# Patient Record
Sex: MALE | Race: BLACK OR AFRICAN AMERICAN | NOT HISPANIC OR LATINO | Employment: OTHER | URBAN - METROPOLITAN AREA
[De-identification: names, ages, dates, MRNs, and addresses within clinical notes are randomized per-mention and may not be internally consistent; named-entity substitution may affect disease eponyms.]

---

## 2019-08-25 ENCOUNTER — HOSPITAL ENCOUNTER (EMERGENCY)
Facility: HOSPITAL | Age: 64
Discharge: HOME | End: 2019-08-25
Attending: EMERGENCY MEDICINE
Payer: MEDICAID

## 2019-08-25 VITALS
BODY MASS INDEX: 23.84 KG/M2 | OXYGEN SATURATION: 99 % | DIASTOLIC BLOOD PRESSURE: 86 MMHG | HEIGHT: 69 IN | SYSTOLIC BLOOD PRESSURE: 150 MMHG | TEMPERATURE: 98 F | HEART RATE: 88 BPM | RESPIRATION RATE: 20 BRPM | WEIGHT: 160.94 LBS

## 2019-08-25 DIAGNOSIS — N41.9 PROSTATITIS, UNSPECIFIED PROSTATITIS TYPE: Primary | ICD-10-CM

## 2019-08-25 LAB
BACTERIA #/AREA URNS AUTO: ABNORMAL /HPF
BILIRUB UR QL STRIP: NEGATIVE
CLARITY UR REFRACT.AUTO: CLEAR
COLOR UR AUTO: YELLOW
GLUCOSE UR QL STRIP: NEGATIVE
HGB UR QL STRIP: NEGATIVE
HYALINE CASTS UR QL AUTO: 0 /LPF
KETONES UR QL STRIP: ABNORMAL
LEUKOCYTE ESTERASE UR QL STRIP: NEGATIVE
MICROSCOPIC COMMENT: ABNORMAL
NITRITE UR QL STRIP: NEGATIVE
OTHER ELEMENTS URNS MICRO: ABNORMAL
PH UR STRIP: 6 [PH] (ref 5–8)
PROT UR QL STRIP: ABNORMAL
RBC #/AREA URNS AUTO: 15 /HPF (ref 0–4)
SP GR UR STRIP: 1.02 (ref 1–1.03)
SQUAMOUS #/AREA URNS AUTO: 0 /HPF
URN SPEC COLLECT METH UR: ABNORMAL
UROBILINOGEN UR STRIP-ACNC: <2 EU/DL
WBC #/AREA URNS AUTO: 7 /HPF (ref 0–5)
YEAST UR QL AUTO: ABNORMAL

## 2019-08-25 PROCEDURE — 99283 EMERGENCY DEPT VISIT LOW MDM: CPT | Mod: ER

## 2019-08-25 PROCEDURE — 25000003 PHARM REV CODE 250: Mod: ER | Performed by: EMERGENCY MEDICINE

## 2019-08-25 PROCEDURE — 81000 URINALYSIS NONAUTO W/SCOPE: CPT | Mod: ER

## 2019-08-25 RX ORDER — ACETAMINOPHEN 500 MG
1000 TABLET ORAL
Status: COMPLETED | OUTPATIENT
Start: 2019-08-25 | End: 2019-08-25

## 2019-08-25 RX ORDER — PHENAZOPYRIDINE HYDROCHLORIDE 100 MG/1
100 TABLET, FILM COATED ORAL 3 TIMES DAILY PRN
COMMUNITY

## 2019-08-25 RX ORDER — CIPROFLOXACIN 500 MG/1
500 TABLET ORAL
Status: COMPLETED | OUTPATIENT
Start: 2019-08-25 | End: 2019-08-25

## 2019-08-25 RX ORDER — NITROFURANTOIN 25; 75 MG/1; MG/1
100 CAPSULE ORAL
COMMUNITY

## 2019-08-25 RX ORDER — IBUPROFEN 800 MG/1
800 TABLET ORAL 3 TIMES DAILY
COMMUNITY

## 2019-08-25 RX ORDER — CIPROFLOXACIN 500 MG/1
500 TABLET ORAL 2 TIMES DAILY
Qty: 20 TABLET | Refills: 0 | Status: SHIPPED | OUTPATIENT
Start: 2019-08-25 | End: 2019-09-04

## 2019-08-25 RX ORDER — TRAMADOL HYDROCHLORIDE 100 MG/1
100 TABLET, EXTENDED RELEASE ORAL DAILY
COMMUNITY

## 2019-08-25 RX ADMIN — ACETAMINOPHEN 1000 MG: 500 TABLET ORAL at 08:08

## 2019-08-25 RX ADMIN — CIPROFLOXACIN HYDROCHLORIDE 500 MG: 500 TABLET, FILM COATED ORAL at 08:08

## 2019-08-26 NOTE — ED PROVIDER NOTES
"Encounter Date: 8/25/2019       History     Chief Complaint   Patient presents with    Generalized Body Aches     c/o pain to back rectum,"privates"     Patient currently presents with complaints of pressure in the perineum.  Onset noted gradually over the past several weeks.  Patient unable to pinpoint a specific time frame.  He does note some associated dysuria intermittently.  He also describes pain in the rectal area.  Reports history of prior urinary infections. History somewhat limited by patient's chronic dementia.        Review of patient's allergies indicates:  No Known Allergies  Past Medical History:   Diagnosis Date    Bipolar 1 disorder     Hyperlipidemia     Schizophrenia      History reviewed. No pertinent surgical history.  History reviewed. No pertinent family history.  Social History     Tobacco Use    Smoking status: Current Every Day Smoker     Packs/day: 1.00    Smokeless tobacco: Never Used   Substance Use Topics    Alcohol use: Not Currently    Drug use: Not Currently     Review of Systems   Constitutional: Negative for chills and fever.   HENT: Negative for congestion.    Respiratory: Negative for chest tightness and shortness of breath.    Cardiovascular: Negative for chest pain and leg swelling.   Gastrointestinal: Negative for abdominal pain, constipation, diarrhea, nausea and vomiting.   Genitourinary: Positive for dysuria. Negative for decreased urine volume, difficulty urinating, discharge, flank pain, frequency, genital sores, hematuria, penile swelling, scrotal swelling, testicular pain and urgency.   Skin: Negative for color change and rash.   Allergic/Immunologic: Negative for immunocompromised state.   Neurological: Negative for weakness and numbness.   Hematological: Negative for adenopathy. Does not bruise/bleed easily.   All other systems reviewed and are negative.    Physical Exam     Initial Vitals [08/25/19 2004]   BP Pulse Resp Temp SpO2   (!) 150/86 88 20 98.3 °F " "(36.8 °C) 99 %      MAP       --         Vitals:    08/25/19 2004   BP: (!) 150/86   Pulse: 88   Resp: 20   Temp: 98.3 °F (36.8 °C)   TempSrc: Oral   SpO2: 99%   Weight: 73 kg (160 lb 15 oz)   Height: 5' 9" (1.753 m)       Physical Exam    Nursing note and vitals reviewed.  Constitutional: He appears well-developed and well-nourished. He is not diaphoretic. No distress.   HENT:   Head: Normocephalic and atraumatic.   Nose: Nose normal.   Mouth/Throat: Oropharynx is clear and moist.   Eyes: Conjunctivae and EOM are normal. Pupils are equal, round, and reactive to light. No scleral icterus.   Neck: Neck supple. No JVD present.   Cardiovascular: Normal rate, regular rhythm, normal heart sounds and intact distal pulses. Exam reveals no gallop and no friction rub.    No murmur heard.  Pulmonary/Chest: Breath sounds normal. No respiratory distress. He has no wheezes. He has no rhonchi. He has no rales.   Abdominal: Soft. Bowel sounds are normal. He exhibits no distension. There is no tenderness. Hernia confirmed negative in the right inguinal area and confirmed negative in the left inguinal area.   Genitourinary: Rectum normal, testes normal and penis normal. Prostate is enlarged and tender (mild).   Musculoskeletal: Normal range of motion. He exhibits no edema.   Lymphadenopathy: No inguinal adenopathy noted on the right or left side.   Neurological: He is alert and oriented to person, place, and time. He has normal strength. No cranial nerve deficit. GCS score is 15. GCS eye subscore is 4. GCS verbal subscore is 5. GCS motor subscore is 6.   Skin: Skin is warm and dry. No rash noted.   Superficial linear abrasions noted at the patient's belt line on the lower abdominal wall   Psychiatric: He has a normal mood and affect. His behavior is normal.       ED Course   Procedures  Labs Reviewed   URINALYSIS, REFLEX TO URINE CULTURE - Abnormal; Notable for the following components:       Result Value    Protein, UA 1+ (*)     " Ketones, UA Trace (*)     All other components within normal limits    Narrative:     Preferred Collection Type->Urine, Clean Catch   URINALYSIS MICROSCOPIC - Abnormal; Notable for the following components:    RBC, UA 15 (*)     WBC, UA 7 (*)     Bacteria Moderate (*)     Other (U/A) Moderate (*)     All other components within normal limits    Narrative:     Preferred Collection Type->Urine, Clean Catch          Imaging Results    None           Medical Decision Making:   ED Management:  All findings were reviewed with the patient/family in detail along with the diagnosis of uncomplicated prostatitis.  NH staff advised of need for PCP FU to insure resolution of this process and consideration for PSA levels and uro eval.  I see no indication of an emergent process beyond that addressed during our encounter but have duly counseled the patient/family regarding the need for prompt follow-up as well as the indications that should prompt immediate return to the emergency room should new or worrisome developments occur.  The patient/family communicates understanding of all this information and all remaining questions and concerns were addressed at this time.                          Clinical Impression:       ICD-10-CM ICD-9-CM   1. Prostatitis, unspecified prostatitis type N41.9 601.9                                Deejay Mills MD  08/25/19 2053       Deejay Mills MD  08/25/19 2114

## 2019-08-26 NOTE — ED NOTES
Report given to JO Perez at NH. Advised Ana of Dx and for pt to have follow-up with PCP and to fill script of cipro. Awaiting ride at this time.

## 2019-08-31 ENCOUNTER — HOSPITAL ENCOUNTER (EMERGENCY)
Facility: HOSPITAL | Age: 64
Discharge: PSYCHIATRIC OR SUBSTANCE USE FACILITY | End: 2019-08-31
Attending: EMERGENCY MEDICINE
Payer: MEDICAID

## 2019-08-31 VITALS
OXYGEN SATURATION: 97 % | DIASTOLIC BLOOD PRESSURE: 59 MMHG | BODY MASS INDEX: 24.41 KG/M2 | WEIGHT: 164.81 LBS | TEMPERATURE: 98 F | SYSTOLIC BLOOD PRESSURE: 139 MMHG | RESPIRATION RATE: 18 BRPM | HEART RATE: 82 BPM | HEIGHT: 69 IN

## 2019-08-31 DIAGNOSIS — F12.90 MARIJUANA USE: ICD-10-CM

## 2019-08-31 DIAGNOSIS — F22 DELUSION: ICD-10-CM

## 2019-08-31 DIAGNOSIS — N41.9 PROSTATITIS, UNSPECIFIED PROSTATITIS TYPE: ICD-10-CM

## 2019-08-31 DIAGNOSIS — F20.9 SCHIZOPHRENIA, UNSPECIFIED TYPE: ICD-10-CM

## 2019-08-31 DIAGNOSIS — R45.851 SUICIDAL IDEATION: Primary | ICD-10-CM

## 2019-08-31 PROBLEM — F29 PSYCHOSIS: Status: ACTIVE | Noted: 2019-08-31

## 2019-08-31 LAB
ALBUMIN SERPL BCP-MCNC: 3.4 G/DL (ref 3.5–5.2)
ALP SERPL-CCNC: 88 U/L (ref 55–135)
ALT SERPL W/O P-5'-P-CCNC: 35 U/L (ref 10–44)
AMPHET+METHAMPHET UR QL: NEGATIVE
ANION GAP SERPL CALC-SCNC: 7 MMOL/L (ref 8–16)
APAP SERPL-MCNC: <3 UG/ML (ref 10–20)
APTT BLDCRRT: 31.7 SEC (ref 21–32)
AST SERPL-CCNC: 47 U/L (ref 10–40)
BARBITURATES UR QL SCN>200 NG/ML: NEGATIVE
BASOPHILS # BLD AUTO: 0.02 K/UL (ref 0–0.2)
BASOPHILS NFR BLD: 0.3 % (ref 0–1.9)
BENZODIAZ UR QL SCN>200 NG/ML: NEGATIVE
BILIRUB SERPL-MCNC: 0.2 MG/DL (ref 0.1–1)
BILIRUB UR QL STRIP: NEGATIVE
BILIRUB UR QL STRIP: NEGATIVE
BUN SERPL-MCNC: 9 MG/DL (ref 8–23)
BZE UR QL SCN: NEGATIVE
CALCIUM SERPL-MCNC: 9.2 MG/DL (ref 8.7–10.5)
CANNABINOIDS UR QL SCN: NORMAL
CHLORIDE SERPL-SCNC: 100 MMOL/L (ref 95–110)
CLARITY UR REFRACT.AUTO: CLEAR
CLARITY UR REFRACT.AUTO: CLEAR
CO2 SERPL-SCNC: 24 MMOL/L (ref 23–29)
COLOR UR AUTO: YELLOW
COLOR UR AUTO: YELLOW
CREAT SERPL-MCNC: 1.2 MG/DL (ref 0.5–1.4)
CREAT UR-MCNC: 38.9 MG/DL (ref 23–375)
DIFFERENTIAL METHOD: ABNORMAL
EOSINOPHIL # BLD AUTO: 0.1 K/UL (ref 0–0.5)
EOSINOPHIL NFR BLD: 1.2 % (ref 0–8)
ERYTHROCYTE [DISTWIDTH] IN BLOOD BY AUTOMATED COUNT: 13.6 % (ref 11.5–14.5)
EST. GFR  (AFRICAN AMERICAN): >60 ML/MIN/1.73 M^2
EST. GFR  (NON AFRICAN AMERICAN): >60 ML/MIN/1.73 M^2
ETHANOL SERPL-MCNC: <10 MG/DL
GLUCOSE SERPL-MCNC: 101 MG/DL (ref 70–110)
GLUCOSE UR QL STRIP: NEGATIVE
GLUCOSE UR QL STRIP: NEGATIVE
HCT VFR BLD AUTO: 35.6 % (ref 40–54)
HGB BLD-MCNC: 12.1 G/DL (ref 14–18)
HGB UR QL STRIP: NEGATIVE
HGB UR QL STRIP: NEGATIVE
INR PPP: 1.1 (ref 0.8–1.2)
KETONES UR QL STRIP: NEGATIVE
KETONES UR QL STRIP: NEGATIVE
LEUKOCYTE ESTERASE UR QL STRIP: NEGATIVE
LEUKOCYTE ESTERASE UR QL STRIP: NEGATIVE
LYMPHOCYTES # BLD AUTO: 1.9 K/UL (ref 1–4.8)
LYMPHOCYTES NFR BLD: 25.9 % (ref 18–48)
MCH RBC QN AUTO: 31.9 PG (ref 27–31)
MCHC RBC AUTO-ENTMCNC: 34 G/DL (ref 32–36)
MCV RBC AUTO: 94 FL (ref 82–98)
METHADONE UR QL SCN>300 NG/ML: NEGATIVE
MONOCYTES # BLD AUTO: 0.8 K/UL (ref 0.3–1)
MONOCYTES NFR BLD: 10.9 % (ref 4–15)
NEUTROPHILS # BLD AUTO: 4.5 K/UL (ref 1.8–7.7)
NEUTROPHILS NFR BLD: 61.7 % (ref 38–73)
NITRITE UR QL STRIP: NEGATIVE
NITRITE UR QL STRIP: NEGATIVE
OPIATES UR QL SCN: NEGATIVE
PCP UR QL SCN>25 NG/ML: NEGATIVE
PH UR STRIP: 7 [PH] (ref 5–8)
PH UR STRIP: 7 [PH] (ref 5–8)
PLATELET # BLD AUTO: 276 K/UL (ref 150–350)
PMV BLD AUTO: 9.6 FL (ref 9.2–12.9)
POTASSIUM SERPL-SCNC: 4.6 MMOL/L (ref 3.5–5.1)
PROT SERPL-MCNC: 8.3 G/DL (ref 6–8.4)
PROT UR QL STRIP: NEGATIVE
PROT UR QL STRIP: NEGATIVE
PROTHROMBIN TIME: 11.1 SEC (ref 9–12.5)
RBC # BLD AUTO: 3.79 M/UL (ref 4.6–6.2)
SODIUM SERPL-SCNC: 131 MMOL/L (ref 136–145)
SP GR UR STRIP: <=1.005 (ref 1–1.03)
SP GR UR STRIP: <=1.005 (ref 1–1.03)
TOXICOLOGY INFORMATION: NORMAL
TSH SERPL DL<=0.005 MIU/L-ACNC: 0.92 UIU/ML (ref 0.4–4)
URN SPEC COLLECT METH UR: ABNORMAL
URN SPEC COLLECT METH UR: ABNORMAL
UROBILINOGEN UR STRIP-ACNC: NEGATIVE EU/DL
UROBILINOGEN UR STRIP-ACNC: NEGATIVE EU/DL
WBC # BLD AUTO: 7.41 K/UL (ref 3.9–12.7)

## 2019-08-31 PROCEDURE — 96372 THER/PROPH/DIAG INJ SC/IM: CPT | Mod: 59,ER

## 2019-08-31 PROCEDURE — 99285 EMERGENCY DEPT VISIT HI MDM: CPT | Mod: 25,ER

## 2019-08-31 PROCEDURE — 80053 COMPREHEN METABOLIC PANEL: CPT | Mod: ER

## 2019-08-31 PROCEDURE — 85025 COMPLETE CBC W/AUTO DIFF WBC: CPT | Mod: ER

## 2019-08-31 PROCEDURE — 85730 THROMBOPLASTIN TIME PARTIAL: CPT | Mod: ER

## 2019-08-31 PROCEDURE — 80320 DRUG SCREEN QUANTALCOHOLS: CPT | Mod: ER

## 2019-08-31 PROCEDURE — 80329 ANALGESICS NON-OPIOID 1 OR 2: CPT | Mod: ER

## 2019-08-31 PROCEDURE — 63600175 PHARM REV CODE 636 W HCPCS: Mod: ER | Performed by: EMERGENCY MEDICINE

## 2019-08-31 PROCEDURE — 96361 HYDRATE IV INFUSION ADD-ON: CPT | Mod: ER

## 2019-08-31 PROCEDURE — 85610 PROTHROMBIN TIME: CPT | Mod: ER

## 2019-08-31 PROCEDURE — 84443 ASSAY THYROID STIM HORMONE: CPT | Mod: ER

## 2019-08-31 PROCEDURE — 96360 HYDRATION IV INFUSION INIT: CPT | Mod: ER

## 2019-08-31 PROCEDURE — 81003 URINALYSIS AUTO W/O SCOPE: CPT | Mod: 59,ER

## 2019-08-31 PROCEDURE — 80307 DRUG TEST PRSMV CHEM ANLYZR: CPT | Mod: ER

## 2019-08-31 RX ORDER — GABAPENTIN 300 MG/1
300 CAPSULE ORAL 3 TIMES DAILY
Status: ON HOLD | COMMUNITY
End: 2019-09-06 | Stop reason: HOSPADM

## 2019-08-31 RX ORDER — DICYCLOMINE HYDROCHLORIDE 10 MG/ML
20 INJECTION INTRAMUSCULAR
Status: DISCONTINUED | OUTPATIENT
Start: 2019-08-31 | End: 2019-08-31

## 2019-08-31 RX ORDER — DIVALPROEX SODIUM 500 MG/1
500 TABLET, FILM COATED, EXTENDED RELEASE ORAL NIGHTLY
COMMUNITY

## 2019-08-31 RX ORDER — DIVALPROEX SODIUM 250 MG/1
250 TABLET, DELAYED RELEASE ORAL EVERY MORNING
COMMUNITY

## 2019-08-31 RX ORDER — FOLIC ACID 1 MG/1
1 TABLET ORAL DAILY
Status: ON HOLD | COMMUNITY
End: 2019-09-06 | Stop reason: SDUPTHER

## 2019-08-31 RX ORDER — HALOPERIDOL 5 MG/ML
5 INJECTION INTRAMUSCULAR
Status: COMPLETED | OUTPATIENT
Start: 2019-08-31 | End: 2019-08-31

## 2019-08-31 RX ORDER — HALOPERIDOL 5 MG/1
5 TABLET ORAL 2 TIMES DAILY
COMMUNITY

## 2019-08-31 RX ORDER — DIPHENHYDRAMINE HYDROCHLORIDE 50 MG/ML
25 INJECTION INTRAMUSCULAR; INTRAVENOUS
Status: COMPLETED | OUTPATIENT
Start: 2019-08-31 | End: 2019-08-31

## 2019-08-31 RX ORDER — ATORVASTATIN CALCIUM 20 MG/1
20 TABLET, FILM COATED ORAL DAILY
Status: ON HOLD | COMMUNITY
End: 2019-09-06 | Stop reason: HOSPADM

## 2019-08-31 RX ORDER — DIPHENHYDRAMINE HCL 25 MG
25 CAPSULE ORAL
Status: DISCONTINUED | OUTPATIENT
Start: 2019-08-31 | End: 2019-08-31

## 2019-08-31 RX ORDER — IBUPROFEN 200 MG
1 TABLET ORAL
Status: DISCONTINUED | OUTPATIENT
Start: 2019-08-31 | End: 2019-08-31 | Stop reason: HOSPADM

## 2019-08-31 RX ORDER — PHENYTOIN SODIUM 100 MG/1
CAPSULE, EXTENDED RELEASE ORAL EVERY MORNING
Status: ON HOLD | COMMUNITY
End: 2019-09-06 | Stop reason: HOSPADM

## 2019-08-31 RX ORDER — CHOLECALCIFEROL (VITAMIN D3) 25 MCG
1000 TABLET ORAL DAILY
COMMUNITY

## 2019-08-31 RX ORDER — DIPHENHYDRAMINE HYDROCHLORIDE 50 MG/ML
25 INJECTION INTRAMUSCULAR; INTRAVENOUS
Status: DISCONTINUED | OUTPATIENT
Start: 2019-08-31 | End: 2019-08-31

## 2019-08-31 RX ADMIN — LORAZEPAM 2 MG: 2 INJECTION INTRAMUSCULAR; INTRAVENOUS at 04:08

## 2019-08-31 RX ADMIN — HALOPERIDOL LACTATE 5 MG: 5 INJECTION, SOLUTION INTRAMUSCULAR at 11:08

## 2019-08-31 RX ADMIN — SODIUM CHLORIDE 1000 ML: 0.9 INJECTION, SOLUTION INTRAVENOUS at 11:08

## 2019-08-31 RX ADMIN — HALOPERIDOL LACTATE 5 MG: 5 INJECTION, SOLUTION INTRAMUSCULAR at 04:08

## 2019-08-31 RX ADMIN — DIPHENHYDRAMINE HYDROCHLORIDE 25 MG: 50 INJECTION, SOLUTION INTRAMUSCULAR; INTRAVENOUS at 11:08

## 2019-08-31 RX ADMIN — DIPHENHYDRAMINE HYDROCHLORIDE 25 MG: 50 INJECTION INTRAMUSCULAR; INTRAVENOUS at 04:08

## 2019-08-31 NOTE — ED NOTES
"MD at bedside. Pt acting out, being verbally aggressive and hitting door to room. Pt states "I don't need no damn psychiatry". Pt redirected back to bed when attempting to hit KIN Salguero. IPSO called to bedside and assisted in calming pt. Pt continues to be belligerent. MD aware.   "

## 2019-08-31 NOTE — ED NOTES
Pt in NAD, VSS, Resp e/u. AAO x3. Stretcher locked in lowest position. Side rails up x2. Will continue to monitor.
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
## 2019-08-31 NOTE — ED NOTES
Pt in NAD, Resp e/u. Pt asleep, comfortably in stretcher. Stretcher locked in lowest position. Side rails up x2. ERT at bedside for q15min checks. Will continue to monitor.
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
## 2019-08-31 NOTE — ED NOTES
Spoke with Lesley at [facility], pt accepted at [facility] per Dr. Mcmillan. Rebeca KINCAID notified.

## 2019-08-31 NOTE — ED NOTES
"Pt in NAD, VSS, Resp e/u. AAO x3. Pt continuously asking to "go outside to smoke". Pt given meal, crackers, and apple juice at this time. Stretcher locked in lowest position. ERT at side for q15min checks. Pt room continues to be clear per [facility] protocol. Will continue to monitor.     "

## 2019-08-31 NOTE — ED NOTES
Pt in NAD, Resp e/u. Pt asleep in stretcher, locked in lowest position. Side rails up x2. Will continue to monitor.

## 2019-08-31 NOTE — ED NOTES
Pt in NAD, VSS, Resp e/u. Stretcher locked in lowest position. Side rails up x2. Sitter at bedside for Q15MIN checks. Will continue to monitor.

## 2019-08-31 NOTE — ED NOTES
Pt belongings as follows:  1 beige pants  1 jogging pants  1 short sleeve t-shirt  1 long sleave grey plaid shirt  1 pr white socks  I pr black shoes

## 2019-09-01 PROBLEM — G40.909 SEIZURE DISORDER: Chronic | Status: ACTIVE | Noted: 2019-09-01

## 2019-09-01 PROBLEM — E78.5 HYPERLIPIDEMIA: Chronic | Status: ACTIVE | Noted: 2019-09-01

## 2019-09-01 PROBLEM — D64.9 ANEMIA: Status: ACTIVE | Noted: 2019-09-01

## 2019-09-01 PROBLEM — F12.10 MILD TETRAHYDROCANNABINOL (THC) ABUSE: Status: ACTIVE | Noted: 2019-09-01
